# Patient Record
(demographics unavailable — no encounter records)

---

## 2024-11-11 NOTE — HISTORY OF PRESENT ILLNESS
[de-identified] : BRANDON is a 59 year old female s/p Robot-assisted laparoscopic sleeve gastrectomy and hiatal hernia repair. 07/24/24 She presents today for her routine follow-up.  She continues to do well postoperatively.  Her weight loss has been excellent.  She reports no dysphagia.  She has some reflux, worse if she eats too quickly or does not wait between eating and drinking. Otherwise reports no abdominal pain.  Is down 26 pounds in the last 3 months.

## 2024-11-11 NOTE — HISTORY OF PRESENT ILLNESS
[de-identified] : BRANDON is a 59 year old female s/p Robot-assisted laparoscopic sleeve gastrectomy and hiatal hernia repair. 07/24/24 She presents today for her routine follow-up.  She continues to do well postoperatively.  Her weight loss has been excellent.  She reports no dysphagia.  She has some reflux, worse if she eats too quickly or does not wait between eating and drinking. Otherwise reports no abdominal pain.  Is down 26 pounds in the last 3 months.

## 2024-11-11 NOTE — ASSESSMENT
[FreeTextEntry1] : 59-year-old female doing well status post laparoscopic sleeve gastrectomy and hiatal hernia repair on 7/24/2024. Mild reflux after stopping omeprazole. Preoperative weight 240 pounds, BMI 40 Current weight 185 pounds, BMI 30.8

## 2024-11-11 NOTE — PHYSICAL EXAM
[Normal] : affect appropriate [de-identified] : Normal respirations [de-identified] : Soft, nontender, nondistended.  Incisions well-healed. Griseofulvin Counseling:  I discussed with the patient the risks of griseofulvin including but not limited to photosensitivity, cytopenia, liver damage, nausea/vomiting and severe allergy.  The patient understands that this medication is best absorbed when taken with a fatty meal (e.g., ice cream or french fries).

## 2024-11-11 NOTE — PHYSICAL EXAM
[Normal] : affect appropriate [de-identified] : Normal respirations [de-identified] : Soft, nontender, nondistended.  Incisions well-healed.

## 2024-11-11 NOTE — PLAN
[FreeTextEntry1] : [] labs ordered [] cont bariatric diet and consult with nutritionist as needed [] goal 30 min cardiovascular exercise daily, with some weight resistance training as tolerated [] continue multivitamins [] Omeprazole ordered [] f/u 3 months

## 2025-02-03 NOTE — PLAN
[FreeTextEntry1] : [] 6-month labs reviewed; no changes in multivitamins recommended [] cont bariatric diet and consult with nutritionist as needed [] goal 30 min cardiovascular exercise daily, with some weight resistance training as tolerated [] continue multivitamins [] f/u 6 months

## 2025-02-03 NOTE — HISTORY OF PRESENT ILLNESS
[de-identified] : BRANDON is a 59 year old female s/p Robot-assisted laparoscopic sleeve gastrectomy and hiatal hernia repair. 07/24/24 She presents for routine 7-month follow-up visit.  She is overall doing very well.  Excellent weight loss thus far.  She continues to have mild reflux, well-controlled with omeprazole.  She reports no dysphagia or abdominal pain.  She is maintaining a healthy diet and a high level of activity.  She feels very happy with her results.

## 2025-02-03 NOTE — HISTORY OF PRESENT ILLNESS
[de-identified] : BRANDON is a 59 year old female s/p Robot-assisted laparoscopic sleeve gastrectomy and hiatal hernia repair. 07/24/24 She presents for routine 7-month follow-up visit.  She is overall doing very well.  Excellent weight loss thus far.  She continues to have mild reflux, well-controlled with omeprazole.  She reports no dysphagia or abdominal pain.  She is maintaining a healthy diet and a high level of activity.  She feels very happy with her results.

## 2025-02-03 NOTE — PHYSICAL EXAM
[Normal] : affect appropriate [de-identified] : Normal respirations [de-identified] : Soft, nontender, nondistended.  Incisions well-healed.

## 2025-02-03 NOTE — ASSESSMENT
[FreeTextEntry1] : 59-year-old female doing well status post laparoscopic sleeve gastrectomy and hiatal hernia repair on 7/24/2024. Mild reflux after stopping omeprazole. Preoperative weight 240 pounds, BMI 40 Current weight 170 pounds, BMI 28

## 2025-02-03 NOTE — PHYSICAL EXAM
[Normal] : affect appropriate [de-identified] : Normal respirations [de-identified] : Soft, nontender, nondistended.  Incisions well-healed.